# Patient Record
Sex: MALE | Race: WHITE | NOT HISPANIC OR LATINO
[De-identification: names, ages, dates, MRNs, and addresses within clinical notes are randomized per-mention and may not be internally consistent; named-entity substitution may affect disease eponyms.]

---

## 2024-02-28 PROBLEM — Z00.00 ENCOUNTER FOR PREVENTIVE HEALTH EXAMINATION: Status: ACTIVE | Noted: 2024-02-28

## 2024-03-05 ENCOUNTER — APPOINTMENT (OUTPATIENT)
Dept: SURGERY | Facility: CLINIC | Age: 59
End: 2024-03-05
Payer: COMMERCIAL

## 2024-03-05 VITALS
SYSTOLIC BLOOD PRESSURE: 119 MMHG | BODY MASS INDEX: 31.65 KG/M2 | HEART RATE: 60 BPM | WEIGHT: 190 LBS | DIASTOLIC BLOOD PRESSURE: 69 MMHG | HEIGHT: 65 IN

## 2024-03-05 DIAGNOSIS — Z85.6 PERSONAL HISTORY OF LEUKEMIA: ICD-10-CM

## 2024-03-05 DIAGNOSIS — Z80.1 FAMILY HISTORY OF MALIGNANT NEOPLASM OF TRACHEA, BRONCHUS AND LUNG: ICD-10-CM

## 2024-03-05 DIAGNOSIS — Z86.79 PERSONAL HISTORY OF OTHER DISEASES OF THE CIRCULATORY SYSTEM: ICD-10-CM

## 2024-03-05 DIAGNOSIS — Z78.9 OTHER SPECIFIED HEALTH STATUS: ICD-10-CM

## 2024-03-05 DIAGNOSIS — Z80.0 FAMILY HISTORY OF MALIGNANT NEOPLASM OF DIGESTIVE ORGANS: ICD-10-CM

## 2024-03-05 PROCEDURE — 99204 OFFICE O/P NEW MOD 45 MIN: CPT

## 2024-03-05 RX ORDER — VALSARTAN 40 MG/1
TABLET, COATED ORAL
Refills: 0 | Status: ACTIVE | COMMUNITY

## 2024-03-05 RX ORDER — AMLODIPINE BESYLATE 5 MG/1
TABLET ORAL
Refills: 0 | Status: ACTIVE | COMMUNITY

## 2024-03-05 NOTE — HISTORY OF PRESENT ILLNESS
[de-identified] : Pt c/o Thyroid nodule found on recent examination. denies dysphagia, hoarseness, SOB or RT exposure.  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 sonogram:  Right 3.0 cm nodule FNA: FLUS, [id number] positive normal TFTs, calcium 9.1 I have reviewed all old and new data and available images.  Additional information was obtained from others present at the time of visit to ensure the completeness of the history

## 2024-03-05 NOTE — CONSULT LETTER
[Dear  ___] : Dear  [unfilled], [Consult Letter:] : I had the pleasure of evaluating your patient, [unfilled]. [Please see my note below.] : Please see my note below. [Sincerely,] : Sincerely, [Consult Closing:] : Thank you very much for allowing me to participate in the care of this patient.  If you have any questions, please do not hesitate to contact me. [FreeTextEntry2] : Dr. Sandy Randall, Dr. Willem Farley [FreeTextEntry3] : Antonio Gamble MD, FACS System Director, Endocrine Surgery [facility] Associate  Professor of Surgery [facility] [employer] at [facility] [DrAngela  ___] : Dr. HERNANDEZ

## 2024-03-05 NOTE — ASSESSMENT
[FreeTextEntry1] : lengthy discussion regarding options for management including active surveillance  vs thyroid lobectomy with possible paratracheal node dissection, with or without frozen section vs total thyroidectomy with possible paratracheal node dissection.  risks, benefits and alternatives discussed at length.  I have discussed with the patient the anatomy of the area, the pathophysiology of the disease process and the rationale for surgery.  The attendant risks, possible complications and expected postoperative course have been discussed in detail.  I have given the patient the opportunity to ask questions, and all questions have been answered to the patient's satisfaction, and they wish to proceed with the planned procedure.  to be scheduled inpatient at [facility]. requested sonogram to assess cervical nodes. to call next week for results.

## 2024-03-05 NOTE — PHYSICAL EXAM
[de-identified] : 3 cm right thyroid nodule, well circumscribed and mobile [Laryngoscopy Performed] : laryngoscopy was performed, see procedure section for findings [Midline] : located in midline position [Normal] : orientation to person, place, and time: normal [de-identified] : indirect  laryngoscopy shows normal vocal cord mobility bilaterally with no lesions noted

## 2024-03-25 ENCOUNTER — OUTPATIENT (OUTPATIENT)
Dept: OUTPATIENT SERVICES | Facility: HOSPITAL | Age: 59
LOS: 1 days | End: 2024-03-25
Payer: COMMERCIAL

## 2024-03-25 ENCOUNTER — APPOINTMENT (OUTPATIENT)
Dept: ULTRASOUND IMAGING | Facility: IMAGING CENTER | Age: 59
End: 2024-03-25
Payer: COMMERCIAL

## 2024-03-25 DIAGNOSIS — E04.1 NONTOXIC SINGLE THYROID NODULE: ICD-10-CM

## 2024-03-25 PROCEDURE — 76536 US EXAM OF HEAD AND NECK: CPT | Mod: 26

## 2024-03-25 PROCEDURE — 76536 US EXAM OF HEAD AND NECK: CPT

## 2024-06-03 ENCOUNTER — OUTPATIENT (OUTPATIENT)
Dept: OUTPATIENT SERVICES | Facility: HOSPITAL | Age: 59
LOS: 1 days | End: 2024-06-03

## 2024-06-03 VITALS
HEIGHT: 65 IN | DIASTOLIC BLOOD PRESSURE: 87 MMHG | HEART RATE: 71 BPM | TEMPERATURE: 98 F | WEIGHT: 194.01 LBS | RESPIRATION RATE: 16 BRPM | OXYGEN SATURATION: 97 % | SYSTOLIC BLOOD PRESSURE: 138 MMHG

## 2024-06-03 DIAGNOSIS — E04.1 NONTOXIC SINGLE THYROID NODULE: ICD-10-CM

## 2024-06-03 DIAGNOSIS — Z98.2 PRESENCE OF CEREBROSPINAL FLUID DRAINAGE DEVICE: Chronic | ICD-10-CM

## 2024-06-03 DIAGNOSIS — I10 ESSENTIAL (PRIMARY) HYPERTENSION: ICD-10-CM

## 2024-06-03 DIAGNOSIS — Z91.89 OTHER SPECIFIED PERSONAL RISK FACTORS, NOT ELSEWHERE CLASSIFIED: ICD-10-CM

## 2024-06-03 LAB
ALBUMIN SERPL ELPH-MCNC: 4.4 G/DL — SIGNIFICANT CHANGE UP (ref 3.3–5)
ALP SERPL-CCNC: 72 U/L — SIGNIFICANT CHANGE UP (ref 40–120)
ALT FLD-CCNC: 19 U/L — SIGNIFICANT CHANGE UP (ref 4–41)
ANION GAP SERPL CALC-SCNC: 11 MMOL/L — SIGNIFICANT CHANGE UP (ref 7–14)
AST SERPL-CCNC: 18 U/L — SIGNIFICANT CHANGE UP (ref 4–40)
BILIRUB SERPL-MCNC: 0.4 MG/DL — SIGNIFICANT CHANGE UP (ref 0.2–1.2)
BUN SERPL-MCNC: 14 MG/DL — SIGNIFICANT CHANGE UP (ref 7–23)
CALCIUM SERPL-MCNC: 9.3 MG/DL — SIGNIFICANT CHANGE UP (ref 8.4–10.5)
CHLORIDE SERPL-SCNC: 105 MMOL/L — SIGNIFICANT CHANGE UP (ref 98–107)
CO2 SERPL-SCNC: 25 MMOL/L — SIGNIFICANT CHANGE UP (ref 22–31)
CREAT SERPL-MCNC: 0.93 MG/DL — SIGNIFICANT CHANGE UP (ref 0.5–1.3)
EGFR: 96 ML/MIN/1.73M2 — SIGNIFICANT CHANGE UP
GLUCOSE SERPL-MCNC: 60 MG/DL — LOW (ref 70–99)
HCT VFR BLD CALC: 43.3 % — SIGNIFICANT CHANGE UP (ref 39–50)
HGB BLD-MCNC: 15.1 G/DL — SIGNIFICANT CHANGE UP (ref 13–17)
MCHC RBC-ENTMCNC: 31.3 PG — SIGNIFICANT CHANGE UP (ref 27–34)
MCHC RBC-ENTMCNC: 34.9 GM/DL — SIGNIFICANT CHANGE UP (ref 32–36)
MCV RBC AUTO: 89.6 FL — SIGNIFICANT CHANGE UP (ref 80–100)
NRBC # BLD: 0 /100 WBCS — SIGNIFICANT CHANGE UP (ref 0–0)
NRBC # FLD: 0 K/UL — SIGNIFICANT CHANGE UP (ref 0–0)
PLATELET # BLD AUTO: 181 K/UL — SIGNIFICANT CHANGE UP (ref 150–400)
POTASSIUM SERPL-MCNC: 4.4 MMOL/L — SIGNIFICANT CHANGE UP (ref 3.5–5.3)
POTASSIUM SERPL-SCNC: 4.4 MMOL/L — SIGNIFICANT CHANGE UP (ref 3.5–5.3)
PROT SERPL-MCNC: 7 G/DL — SIGNIFICANT CHANGE UP (ref 6–8.3)
RBC # BLD: 4.83 M/UL — SIGNIFICANT CHANGE UP (ref 4.2–5.8)
RBC # FLD: 13.7 % — SIGNIFICANT CHANGE UP (ref 10.3–14.5)
SODIUM SERPL-SCNC: 141 MMOL/L — SIGNIFICANT CHANGE UP (ref 135–145)
WBC # BLD: 4.8 K/UL — SIGNIFICANT CHANGE UP (ref 3.8–10.5)
WBC # FLD AUTO: 4.8 K/UL — SIGNIFICANT CHANGE UP (ref 3.8–10.5)

## 2024-06-03 RX ORDER — AMLODIPINE AND VALSARTAN 5; 320 MG/1; MG/1
1 TABLET, FILM COATED ORAL
Refills: 0 | DISCHARGE

## 2024-06-03 NOTE — H&P PST ADULT - PROBLEM SELECTOR PLAN 1
Scheduled right thyroid lobectomy, paratracheal node dissection, possible total thyroidectomy.  Written & verbal preop instructions, gi prophylaxis & surgical soap given  Pt verbalized good understanding.  Teach back done on surgical soap instructions.

## 2024-06-03 NOTE — H&P PST ADULT - HEMATOLOGY/LYMPHATICS COMMENTS
h/o leukemia, h/o shunt placed at age 10 for treatment of leukemia, which has been non functional and has not been interrogated for past 20 years.

## 2024-06-03 NOTE — H&P PST ADULT - NSICDXFAMILYHX_GEN_ALL_CORE_FT
FAMILY HISTORY:  Father  Still living? Unknown  Family history of hypertension, Age at diagnosis: Age Unknown    Mother  Still living? Unknown  Family history of colon cancer, Age at diagnosis: Age Unknown  Family history of hypertension, Age at diagnosis: Age Unknown  Family history of lung cancer, Age at diagnosis: Age Unknown

## 2024-06-03 NOTE — H&P PST ADULT - NSICDXPASTMEDICALHX_GEN_ALL_CORE_FT
PAST MEDICAL HISTORY:  H/O leukemia     History of hepatitis C     Hypertension     Nontoxic single thyroid nodule     Obesity

## 2024-06-03 NOTE — H&P PST ADULT - HISTORY OF PRESENT ILLNESS
59y/o male Presents for preop eval for scheduled right thyroid lobectomy, paratracheal node dissection, possible total thyroidectomy.  Pt states thyroid nodule found on recent examination with PCP. denies dysphagia, hoarseness, SOB or RT exposure. 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  sonogram: Right 3.0 cm nodule  FNA: FLUS, [id number] positive 57y/o male Presents for preop eval for scheduled right thyroid lobectomy, paratracheal node dissection, possible total thyroidectomy.  Pt states thyroid nodule found on recent examination with PCP. Sonogram and biopsy done - negative.  Preop dx nontoxic single thyroid nodule.

## 2024-06-09 ENCOUNTER — TRANSCRIPTION ENCOUNTER (OUTPATIENT)
Age: 59
End: 2024-06-09

## 2024-06-10 ENCOUNTER — OUTPATIENT (OUTPATIENT)
Dept: OUTPATIENT SERVICES | Facility: HOSPITAL | Age: 59
LOS: 1 days | Discharge: HOME | End: 2024-06-10
Payer: COMMERCIAL

## 2024-06-10 ENCOUNTER — TRANSCRIPTION ENCOUNTER (OUTPATIENT)
Age: 59
End: 2024-06-10

## 2024-06-10 ENCOUNTER — RESULT REVIEW (OUTPATIENT)
Age: 59
End: 2024-06-10

## 2024-06-10 ENCOUNTER — APPOINTMENT (OUTPATIENT)
Dept: SURGERY | Facility: HOSPITAL | Age: 59
End: 2024-06-10
Payer: COMMERCIAL

## 2024-06-10 VITALS
SYSTOLIC BLOOD PRESSURE: 144 MMHG | HEIGHT: 65 IN | DIASTOLIC BLOOD PRESSURE: 93 MMHG | WEIGHT: 194.01 LBS | TEMPERATURE: 98 F | RESPIRATION RATE: 14 BRPM | HEART RATE: 63 BPM | OXYGEN SATURATION: 97 %

## 2024-06-10 DIAGNOSIS — E04.1 NONTOXIC SINGLE THYROID NODULE: ICD-10-CM

## 2024-06-10 DIAGNOSIS — Z98.2 PRESENCE OF CEREBROSPINAL FLUID DRAINAGE DEVICE: Chronic | ICD-10-CM

## 2024-06-10 PROCEDURE — 13132 CMPLX RPR F/C/C/M/N/AX/G/H/F: CPT | Mod: 59

## 2024-06-10 PROCEDURE — 60252 REMOVAL OF THYROID: CPT

## 2024-06-10 PROCEDURE — 88307 TISSUE EXAM BY PATHOLOGIST: CPT | Mod: 26

## 2024-06-10 DEVICE — LIGATING CLIPS WECK HORIZON SMALL-WIDE (RED) 24: Type: IMPLANTABLE DEVICE | Status: FUNCTIONAL

## 2024-06-10 RX ORDER — OXYCODONE HYDROCHLORIDE 5 MG/1
5 TABLET ORAL EVERY 6 HOURS
Refills: 0 | Status: DISCONTINUED | OUTPATIENT
Start: 2024-06-10 | End: 2024-06-11

## 2024-06-10 RX ORDER — AMLODIPINE BESYLATE 2.5 MG/1
5 TABLET ORAL DAILY
Refills: 0 | Status: DISCONTINUED | OUTPATIENT
Start: 2024-06-10 | End: 2024-06-10

## 2024-06-10 RX ORDER — VALSARTAN 80 MG/1
160 TABLET ORAL DAILY
Refills: 0 | Status: DISCONTINUED | OUTPATIENT
Start: 2024-06-10 | End: 2024-06-11

## 2024-06-10 RX ORDER — AMLODIPINE BESYLATE 2.5 MG/1
5 TABLET ORAL DAILY
Refills: 0 | Status: DISCONTINUED | OUTPATIENT
Start: 2024-06-10 | End: 2024-06-11

## 2024-06-10 RX ORDER — SODIUM CHLORIDE 9 MG/ML
1000 INJECTION, SOLUTION INTRAVENOUS
Refills: 0 | Status: DISCONTINUED | OUTPATIENT
Start: 2024-06-10 | End: 2024-06-10

## 2024-06-10 RX ORDER — OXYCODONE HYDROCHLORIDE 5 MG/1
10 TABLET ORAL EVERY 6 HOURS
Refills: 0 | Status: DISCONTINUED | OUTPATIENT
Start: 2024-06-10 | End: 2024-06-11

## 2024-06-10 RX ORDER — FENTANYL CITRATE 50 UG/ML
50 INJECTION INTRAVENOUS
Refills: 0 | Status: DISCONTINUED | OUTPATIENT
Start: 2024-06-10 | End: 2024-06-10

## 2024-06-10 RX ORDER — VALSARTAN 80 MG/1
160 TABLET ORAL DAILY
Refills: 0 | Status: DISCONTINUED | OUTPATIENT
Start: 2024-06-10 | End: 2024-06-10

## 2024-06-10 RX ORDER — OXYCODONE HYDROCHLORIDE 5 MG/1
5 TABLET ORAL ONCE
Refills: 0 | Status: DISCONTINUED | OUTPATIENT
Start: 2024-06-10 | End: 2024-06-10

## 2024-06-10 RX ORDER — ACETAMINOPHEN 500 MG
1000 TABLET ORAL EVERY 6 HOURS
Refills: 0 | Status: DISCONTINUED | OUTPATIENT
Start: 2024-06-10 | End: 2024-06-11

## 2024-06-10 RX ORDER — ONDANSETRON 8 MG/1
4 TABLET, FILM COATED ORAL ONCE
Refills: 0 | Status: DISCONTINUED | OUTPATIENT
Start: 2024-06-10 | End: 2024-06-10

## 2024-06-10 RX ORDER — OXYCODONE HYDROCHLORIDE 5 MG/1
5 TABLET ORAL EVERY 6 HOURS
Refills: 0 | Status: DISCONTINUED | OUTPATIENT
Start: 2024-06-10 | End: 2024-06-10

## 2024-06-10 RX ADMIN — Medication 1000 MILLIGRAM(S): at 17:54

## 2024-06-10 RX ADMIN — Medication 1000 MILLIGRAM(S): at 18:24

## 2024-06-10 NOTE — CHART NOTE - NSCHARTNOTEFT_GEN_A_CORE
Post Operative Note  Patient: JENIFER SMALLS 58y (1965) Male   MRN: [id number]  Location: [address]  Visit: 06-10-24 Inpatient  Date: 06-10-24 @ 15:04    Procedure: S/P right thyroid lobectomy and parathyroid node dissection    Subjective: Patient seen and examined post operatively. Reports pain as controlled. Denies nausea, vomiting, fever, chills, chest pain, SOB, cough.      Objective:  Vitals: T(F): 97.4 (06-10-24 @ 13:40), Max: 98.5 (06-10-24 @ 12:00)  HR: 69 (06-10-24 @ 13:40)  BP: 138/85 (06-10-24 @ 13:40) (133/81 - 144/93)  RR: 17 (06-10-24 @ 13:40)  SpO2: 96% (06-10-24 @ 13:40)  Vent Settings:     In:   06-10-24 @ 07:01  -  06-10-24 @ 15:04  --------------------------------------------------------  IN: 160 mL      IV Fluids: lactated ringers. 1000 milliLiter(s) (30 mL/Hr) IV Continuous <Continuous>      Out:   06-10-24 @ 07:01  -  06-10-24 @ 15:04  --------------------------------------------------------  OUT: 10 mL      EBL:     Voided Urine:   06-10-24 @ 07:01  -  06-10-24 @ 15:04  --------------------------------------------------------  OUT: 10 mL      Umana Catheter: yes no   Drains:   DONNA:   06-10-24 @ 07:01  -  06-10-24 @ 15:04  --------------------------------------------------------  OUT: 10 mL        Physical Examination:  General: NAD, resting comfortably in bed  HEENT: Normocephalic atraumatic, neck incision c/d/i, DONNA x 1 with SS output  Respiratory: Nonlabored respirations, normal CW expansion.  Cardio: S1S2, regular rate and rhythm.  Abdomen: soft, nontender, nondistended  Vascular: extremities are warm and well perfused.       Assessment:  58yMale patient with PMH HTN and thyroid nodule presents s/p R thyroid lobectomy and paratracheal node dissection on 6/10    Plan:  - Diet: regular  - Pain control w tylenol and PRN oxycodone  - DONNA drain monitoring, dc tomorrow am  - Home meds restarted  - Discharge home tomorrow    Date/Time: 06-10-24 @ 15:04

## 2024-06-10 NOTE — BRIEF OPERATIVE NOTE - NSICDXBRIEFPROCEDURE_GEN_ALL_CORE_FT
PROCEDURES:  Partial thyroid lobectomy with isthmusectomy 10-Pineda-2024 11:40:42  Joao Medina Sub

## 2024-06-10 NOTE — BRIEF OPERATIVE NOTE - OPERATION/FINDINGS
Right thyroid lobectomy and isthmusectomy with paratracheal node dissection. Incision was made and R thyroid lobe and isthmus excised. Paratracheal node excised. Hemostasis achieved. DONNA drain placed. Incision closed with chromic and monocryl sutures.

## 2024-06-10 NOTE — DISCHARGE NOTE PROVIDER - NSDCMRMEDTOKEN_GEN_ALL_CORE_FT
amlodipine-valsartan 5 mg-160 mg oral tablet: 1 tab(s) orally once a day   acetaminophen 500 mg oral tablet: 2 tab(s) orally every 6 hours as needed for  mild pain  amlodipine-valsartan 5 mg-160 mg oral tablet: 1 tab(s) orally once a day

## 2024-06-10 NOTE — PATIENT PROFILE ADULT - FALL HARM RISK - HARM RISK INTERVENTIONS

## 2024-06-10 NOTE — DISCHARGE NOTE PROVIDER - CARE PROVIDER_API CALL
Antonio Gamble  Surgery  [address] [place] [address] [address]  [place] [address]  Phone: [phone]  Fax: [phone]  Follow Up Time:

## 2024-06-11 ENCOUNTER — TRANSCRIPTION ENCOUNTER (OUTPATIENT)
Age: 59
End: 2024-06-11

## 2024-06-11 VITALS
HEART RATE: 66 BPM | DIASTOLIC BLOOD PRESSURE: 87 MMHG | TEMPERATURE: 98 F | OXYGEN SATURATION: 97 % | RESPIRATION RATE: 16 BRPM | SYSTOLIC BLOOD PRESSURE: 131 MMHG

## 2024-06-11 RX ORDER — ACETAMINOPHEN 500 MG
2 TABLET ORAL
Qty: 0 | Refills: 0 | DISCHARGE
Start: 2024-06-11

## 2024-06-11 RX ADMIN — VALSARTAN 160 MILLIGRAM(S): 80 TABLET ORAL at 07:38

## 2024-06-11 RX ADMIN — AMLODIPINE BESYLATE 5 MILLIGRAM(S): 2.5 TABLET ORAL at 06:02

## 2024-06-11 RX ADMIN — Medication 1000 MILLIGRAM(S): at 06:02

## 2024-06-11 RX ADMIN — Medication 1000 MILLIGRAM(S): at 06:32

## 2024-06-11 RX ADMIN — Medication 1000 MILLIGRAM(S): at 00:25

## 2024-06-11 RX ADMIN — Medication 1000 MILLIGRAM(S): at 00:08

## 2024-06-11 NOTE — PROGRESS NOTE ADULT - SUBJECTIVE AND OBJECTIVE BOX
1 day s/p thyroid lobectomy. afebrile. no collections. now taking adequate po.  DONNA removed. discharge home.  f/u in office

## 2024-06-11 NOTE — DISCHARGE NOTE NURSING/CASE MANAGEMENT/SOCIAL WORK - PATIENT PORTAL LINK FT
You can access the FollowMyHealth Patient Portal offered by [facility] by registering at the following website: [email or web address]. By joining [employer]’s FollowMyHealth portal, you will also be able to view your health information using other applications (apps) compatible with our system.

## 2024-06-11 NOTE — DISCHARGE NOTE NURSING/CASE MANAGEMENT/SOCIAL WORK - NSDCPEFALRISK_GEN_ALL_CORE
For information on Fall & Injury Prevention, visit: [email or web address] OR  [email or web address] OR  https://www.cdc.gov/steadi/patient.html

## 2024-06-17 ENCOUNTER — NON-APPOINTMENT (OUTPATIENT)
Age: 59
End: 2024-06-17

## 2024-06-18 LAB — SURGICAL PATHOLOGY STUDY: SIGNIFICANT CHANGE UP

## 2024-06-20 ENCOUNTER — APPOINTMENT (OUTPATIENT)
Dept: SURGERY | Facility: CLINIC | Age: 59
End: 2024-06-20
Payer: COMMERCIAL

## 2024-06-20 DIAGNOSIS — E04.1 NONTOXIC SINGLE THYROID NODULE: ICD-10-CM

## 2024-06-20 PROBLEM — Z85.6 PERSONAL HISTORY OF LEUKEMIA: Chronic | Status: ACTIVE | Noted: 2024-06-03

## 2024-06-20 PROBLEM — Z86.19 PERSONAL HISTORY OF OTHER INFECTIOUS AND PARASITIC DISEASES: Chronic | Status: ACTIVE | Noted: 2024-06-03

## 2024-06-20 PROBLEM — I10 ESSENTIAL (PRIMARY) HYPERTENSION: Chronic | Status: ACTIVE | Noted: 2024-06-03

## 2024-06-20 PROBLEM — E66.9 OBESITY, UNSPECIFIED: Chronic | Status: ACTIVE | Noted: 2024-06-03

## 2024-06-20 PROCEDURE — 99024 POSTOP FOLLOW-UP VISIT: CPT

## 2024-06-20 NOTE — ASSESSMENT
[FreeTextEntry1] : s/p Right thyroid lobectomy daily care Dr Gamble discussed path report with Dr Bonner agreed with Completion thyroidectomy Path Discussed in detail with patient and agrees with plan of care f/u Dr bonner f/u 6 weeks

## 2024-06-21 ENCOUNTER — TRANSCRIPTION ENCOUNTER (OUTPATIENT)
Age: 59
End: 2024-06-21

## 2024-08-01 ENCOUNTER — APPOINTMENT (OUTPATIENT)
Dept: SURGERY | Facility: CLINIC | Age: 59
End: 2024-08-01
Payer: COMMERCIAL

## 2024-08-01 ENCOUNTER — NON-APPOINTMENT (OUTPATIENT)
Age: 59
End: 2024-08-01

## 2024-08-01 ENCOUNTER — LABORATORY RESULT (OUTPATIENT)
Age: 59
End: 2024-08-01

## 2024-08-01 DIAGNOSIS — C73 MALIGNANT NEOPLASM OF THYROID GLAND: ICD-10-CM

## 2024-08-01 LAB
T3 SERPL-MCNC: 108 NG/DL
T4 FREE SERPL-MCNC: 1.2 NG/DL
TSH SERPL-ACNC: 2.11 UIU/ML

## 2024-08-01 PROCEDURE — 36415 COLL VENOUS BLD VENIPUNCTURE: CPT

## 2024-08-01 PROCEDURE — 99024 POSTOP FOLLOW-UP VISIT: CPT

## 2024-08-01 NOTE — HISTORY OF PRESENT ILLNESS
[de-identified] : Pt 2 months s/p thyroid lobectomy doing well on no synthroid scheduled for completion in September

## 2024-08-01 NOTE — ASSESSMENT
[FreeTextEntry1] : s/p thyroid lobectomy daily care labs in office f/u after completion thyroidectomy

## 2024-08-01 NOTE — PHYSICAL EXAM
[de-identified] : well healed scar [Midline] : located in midline position [Normal] : orientation to person, place, and time: normal

## 2024-08-02 ENCOUNTER — NON-APPOINTMENT (OUTPATIENT)
Age: 59
End: 2024-08-02

## 2024-08-02 LAB
THYROGLOB AB SERPL-ACNC: <20 IU/ML
THYROGLOB SERPL-MCNC: 9.46 NG/ML

## 2024-09-06 ENCOUNTER — OUTPATIENT (OUTPATIENT)
Dept: OUTPATIENT SERVICES | Facility: HOSPITAL | Age: 59
LOS: 1 days | End: 2024-09-06

## 2024-09-06 VITALS
RESPIRATION RATE: 16 BRPM | OXYGEN SATURATION: 98 % | HEIGHT: 65 IN | SYSTOLIC BLOOD PRESSURE: 128 MMHG | DIASTOLIC BLOOD PRESSURE: 70 MMHG | HEART RATE: 68 BPM | WEIGHT: 188.05 LBS | TEMPERATURE: 98 F

## 2024-09-06 DIAGNOSIS — Z91.89 OTHER SPECIFIED PERSONAL RISK FACTORS, NOT ELSEWHERE CLASSIFIED: ICD-10-CM

## 2024-09-06 DIAGNOSIS — C73 MALIGNANT NEOPLASM OF THYROID GLAND: ICD-10-CM

## 2024-09-06 DIAGNOSIS — Z98.2 PRESENCE OF CEREBROSPINAL FLUID DRAINAGE DEVICE: Chronic | ICD-10-CM

## 2024-09-06 DIAGNOSIS — I10 ESSENTIAL (PRIMARY) HYPERTENSION: ICD-10-CM

## 2024-09-06 NOTE — H&P PST ADULT - HISTORY OF PRESENT ILLNESS
59y/o male Presents for preop eval for scheduled right thyroid lobectomy, paratracheal node dissection, possible total thyroidectomy.  Pt states thyroid nodule found on recent examination with PCP. Sonogram and biopsy done - negative.  Preop dx nontoxic single thyroid nodule. 59 year old male with hx of HTN , leukemia at age 10 with Ommaya shunt in place ( right scalp) since then for treatment of leukemia, which has been non functional and has not been interrogated for past 20 years , states  that he had a recent neck examination with PCP and noted to have right thyroid nodule . SONO and FNA biopsy done which confirmed right thyroid nodule. Pt underwent  right thyroid lobectomy on 06/10/2024 . Pt reports , pathology resulted with malignancy of  thyroid gland. Pt presents  to PST today with pre op dx of malignant neoplasm of thyroid gland and is scheduled for completion of thyroidectomy .

## 2024-09-06 NOTE — H&P PST ADULT - HEMATOLOGY/LYMPHATICS COMMENTS
h/o leukemia, h/o shunt placed at age 10 for treatment of leukemia, which has been non functional and has not been interrogated for past 20 years. h/o leukemia, h/o shunt placed at age 10 for treatment of leukemia, which has been non functional and has not been interrogated for past 20 years.. Pt states " it never bothers him but planning to take it out after thyroid surgery ".

## 2024-09-06 NOTE — H&P PST ADULT - PROBLEM SELECTOR PLAN 1
Patient tentatively scheduled for completion of thyroidectomy on 09/16/2024  Pre-op instructions provided. Pt given verbal and written instructions with teach back on chlorhexidine wash  and pepcid. Pt verbalized understanding with return demonstration.

## 2024-09-06 NOTE — H&P PST ADULT - CARDIOVASCULAR
regular rate and rhythm/S1 S2 present/no murmur/normal PMI/no pedal edema details… regular rate and rhythm/S1 S2 present/no JVD/normal PMI/no pedal edema/vascular

## 2024-09-06 NOTE — H&P PST ADULT - ASSESSMENT
Preop dx nontoxic single thyroid nodule     Pt instructed to take amlodipine-valsartan  on morning of surgery. Malignant neoplasm of thyroid gland

## 2024-09-06 NOTE — H&P PST ADULT - MUSCULOSKELETAL
negative ROM intact/normal gait/strength 5/5 bilateral upper extremities/strength 5/5 bilateral lower extremities details… ROM intact/no calf tenderness/normal gait/strength 5/5 bilateral upper extremities/strength 5/5 bilateral lower extremities/extremities exam

## 2024-09-06 NOTE — H&P PST ADULT - GENITOURINARY MALE
0926: To PACU post EGD. Pt is extubated, breathing is spontaneous and unlabored.   0940: Pt awake and alert. No pain or nausea.  0953: Report given to SAUL Mijares RN.   not examined

## 2024-09-06 NOTE — H&P PST ADULT - NEGATIVE CARDIOVASCULAR SYMPTOMS
no chest pain/no palpitations/no dyspnea on exertion/no orthopnea/no peripheral edema/no claudication no chest pain/no palpitations/no dyspnea on exertion/no peripheral edema

## 2024-09-06 NOTE — H&P PST ADULT - ENDOCRINE COMMENTS
right thyroid nodule - refer to hpi right thyroid nodule - s/p right thyroidectomy 06/10/24 - site benign. Pt reports pathology was positive for malignancy so now  awaiting for  completion of thyroidectomy

## 2024-09-06 NOTE — H&P PST ADULT - NSICDXPASTMEDICALHX_GEN_ALL_CORE_FT
PAST MEDICAL HISTORY:  H/O leukemia     History of hepatitis C     Hypertension     Nontoxic single thyroid nodule     Obesity      PAST MEDICAL HISTORY:  H/O leukemia     History of hepatitis C     Hypertension     Malignant neoplasm of thyroid gland     Nontoxic single thyroid nodule     Obesity

## 2024-09-06 NOTE — H&P PST ADULT - PROBLEM SELECTOR PLAN 4
DELIRIUM SCREENING   1-Patient eligible for jenniffer risk screen age>75? NO  2-Health care proxy paperwork given to patient? Yes, Health care proxy form provided and explained  3-Impaired mobility (ie: uses cane, walker, wheelchair, or assist device)?   4-Known dementia diagnosis?   5-Impaired functional status (METS<4)?   6-Malnutrition BMI<20?

## 2024-09-06 NOTE — H&P PST ADULT - PRIMARY CARE PROVIDER
Refill given . Patient needs appointment for further refills   Dr marbella Farley [phone] Dr Willem Farley [phone]

## 2024-09-13 NOTE — ASU PATIENT PROFILE, ADULT - NSICDXPASTMEDICALHX_GEN_ALL_CORE_FT
PAST MEDICAL HISTORY:  H/O leukemia     History of hepatitis C     Hypertension     Malignant neoplasm of thyroid gland     Nontoxic single thyroid nodule     Obesity

## 2024-09-13 NOTE — ASU PATIENT PROFILE, ADULT - FALL HARM RISK - UNIVERSAL INTERVENTIONS
Bed in lowest position, wheels locked, appropriate side rails in place/Call bell, personal items and telephone in reach/Instruct patient to call for assistance before getting out of bed or chair/Non-slip footwear when patient is out of bed/[place] to call system/Physically safe environment - no spills, clutter or unnecessary equipment/Purposeful Proactive Rounding/Room/bathroom lighting operational, light cord in reach

## 2024-09-13 NOTE — ASU PATIENT PROFILE, ADULT - NSSUBSTANCEUSE_GEN_ALL_CORE_SD
Procedure Note: Treatment was administered using the setting parameters listed above. Detail Level: Simple Add Another Setting?: no Spot Size: 30mm x 9mm Post-Care Instructions: I reviewed with the patient in detail post-care instructions. Patient should avoid sun exposure before and after treatment. Hair Diameter: medium Anesthesia Type: 1% lidocaine with epinephrine Handpiece: Diode Fluence (J/Cm2): 6 Treatment Number: 0 denies/caffeine Treatment Mode: Comfort Pulse Width (Include Units): 1.5 Rate (Hz): 2 Hair Color: light brown Total Pulses (Optional): 10 Location Override: nose Hair Density: low Consent: Written consent obtained.  Risks reviewed including but not limited to crusting, scabbing, blistering, scarring, darker or lighter pigmentary change, paradoxical hair regrowth, incomplete removal of hair and infection. denies street drugs/caffeine

## 2024-09-15 ENCOUNTER — TRANSCRIPTION ENCOUNTER (OUTPATIENT)
Age: 59
End: 2024-09-15

## 2024-09-16 ENCOUNTER — OUTPATIENT (OUTPATIENT)
Dept: OUTPATIENT SERVICES | Facility: HOSPITAL | Age: 59
LOS: 1 days | Discharge: HOME | End: 2024-09-16
Payer: COMMERCIAL

## 2024-09-16 ENCOUNTER — APPOINTMENT (OUTPATIENT)
Dept: SURGERY | Facility: HOSPITAL | Age: 59
End: 2024-09-16
Payer: COMMERCIAL

## 2024-09-16 ENCOUNTER — RESULT REVIEW (OUTPATIENT)
Age: 59
End: 2024-09-16

## 2024-09-16 ENCOUNTER — TRANSCRIPTION ENCOUNTER (OUTPATIENT)
Age: 59
End: 2024-09-16

## 2024-09-16 VITALS
SYSTOLIC BLOOD PRESSURE: 136 MMHG | TEMPERATURE: 98 F | RESPIRATION RATE: 15 BRPM | DIASTOLIC BLOOD PRESSURE: 86 MMHG | OXYGEN SATURATION: 100 % | HEIGHT: 65 IN | WEIGHT: 188.05 LBS | HEART RATE: 62 BPM

## 2024-09-16 VITALS
RESPIRATION RATE: 16 BRPM | DIASTOLIC BLOOD PRESSURE: 86 MMHG | SYSTOLIC BLOOD PRESSURE: 138 MMHG | OXYGEN SATURATION: 96 % | HEART RATE: 71 BPM

## 2024-09-16 DIAGNOSIS — C73 MALIGNANT NEOPLASM OF THYROID GLAND: ICD-10-CM

## 2024-09-16 DIAGNOSIS — Z98.2 PRESENCE OF CEREBROSPINAL FLUID DRAINAGE DEVICE: Chronic | ICD-10-CM

## 2024-09-16 LAB
CALCIUM SERPL-MCNC: 8.9 MG/DL — SIGNIFICANT CHANGE UP (ref 8.4–10.5)
CALCIUM SERPL-MCNC: 9.2 MG/DL — SIGNIFICANT CHANGE UP (ref 8.4–10.5)

## 2024-09-16 PROCEDURE — 13132 CMPLX RPR F/C/C/M/N/AX/G/H/F: CPT | Mod: 59

## 2024-09-16 PROCEDURE — 60260 REPEAT THYROID SURGERY: CPT | Mod: LT

## 2024-09-16 PROCEDURE — 11423 EXC H-F-NK-SP B9+MARG 2.1-3: CPT

## 2024-09-16 PROCEDURE — 88305 TISSUE EXAM BY PATHOLOGIST: CPT | Mod: 26

## 2024-09-16 PROCEDURE — 88307 TISSUE EXAM BY PATHOLOGIST: CPT | Mod: 26

## 2024-09-16 DEVICE — LIGATING CLIPS WECK HORIZON MEDIUM (BLUE) 24
Type: IMPLANTABLE DEVICE | Status: NON-FUNCTIONAL
Removed: 2024-09-16

## 2024-09-16 DEVICE — LIGATING CLIPS WECK HORIZON SMALL-WIDE (RED) 24
Type: IMPLANTABLE DEVICE | Status: NON-FUNCTIONAL
Removed: 2024-09-16

## 2024-09-16 DEVICE — SURGICEL FIBRILLAR 2 X 4"
Type: IMPLANTABLE DEVICE | Status: NON-FUNCTIONAL
Removed: 2024-09-16

## 2024-09-16 RX ORDER — ACETAMINOPHEN 325 MG/1
1000 TABLET ORAL EVERY 6 HOURS
Refills: 0 | Status: DISCONTINUED | OUTPATIENT
Start: 2024-09-16 | End: 2024-09-17

## 2024-09-16 RX ORDER — CALCIUM CARBONATE 500(1250)
1 TABLET ORAL
Qty: 0 | Refills: 0 | DISCHARGE
Start: 2024-09-16

## 2024-09-16 RX ORDER — CALCIUM GLUCONATE 61(648) MG
1 TABLET ORAL ONCE
Refills: 0 | Status: COMPLETED | OUTPATIENT
Start: 2024-09-16 | End: 2024-09-16

## 2024-09-16 RX ORDER — OXYCODONE HYDROCHLORIDE 5 MG/1
5 TABLET ORAL ONCE
Refills: 0 | Status: DISCONTINUED | OUTPATIENT
Start: 2024-09-16 | End: 2024-09-17

## 2024-09-16 RX ORDER — ACETAMINOPHEN 325 MG/1
2 TABLET ORAL
Qty: 0 | Refills: 0 | DISCHARGE
Start: 2024-09-16

## 2024-09-16 RX ORDER — CALCIUM CARBONATE 500(1250)
1 TABLET ORAL EVERY 6 HOURS
Refills: 0 | Status: DISCONTINUED | OUTPATIENT
Start: 2024-09-16 | End: 2024-09-17

## 2024-09-16 RX ORDER — FENTANYL CITRATE 50 UG/ML
50 INJECTION INTRAMUSCULAR; INTRAVENOUS
Refills: 0 | Status: DISCONTINUED | OUTPATIENT
Start: 2024-09-16 | End: 2024-09-17

## 2024-09-16 RX ADMIN — Medication 1 TABLET(S): at 10:03

## 2024-09-16 RX ADMIN — Medication 30 MILLILITER(S): at 10:11

## 2024-09-16 RX ADMIN — Medication 100 GRAM(S): at 09:28

## 2024-09-16 NOTE — BRIEF OPERATIVE NOTE - OPERATION/FINDINGS
History of right thyroid lobectomy. Completion thyroidectomy with excision of neck scar tissue. Hemostasis achieved, DONNA drain left in surgical bed, closed in layers.

## 2024-09-16 NOTE — ASU PREOP CHECKLIST - SKIN PREP
Samples Given: -Sernivo spray 0.05% twice a day to affected areas for 2 weeks
Discontinue Regimen: -prednisone 10 mg tablet\\n-doxycycline monohydrate 100 mg capsule
Detail Level: Zone
Plan: Tissue culture done
Render In Strict Bullet Format?: No
Initiate Treatment: -Bactrim  mg-160 mg tablet Take 1 tablet twice a day for 7 days
done

## 2024-09-16 NOTE — BRIEF OPERATIVE NOTE - COMMENTS
Steri strips to incision, opsite to drain site. Drain secured with silk. Calcium gluconate and calcium checks in PACU

## 2024-09-16 NOTE — ASU DISCHARGE PLAN (ADULT/PEDIATRIC) - CARE PROVIDER_API CALL
Antonio Gamble  Surgery  [address] [place] [address] [address]  [place] [address]  Phone: [phone]  Fax: [phone]  Follow Up Time:

## 2024-09-16 NOTE — ASU DISCHARGE PLAN (ADULT/PEDIATRIC) - NURSING INSTRUCTIONS
You were given intravenous TYLENOL for pain management at 8:20 am. Please DO NOT take any products containing TYLENOL or ACETAMINOPHEN, such as VICODIN, PERCOCET, EXCEDRIN, and any over-the-counter cold medication for the next 6 hours (until __2:20pm ). DO NOT TAKE MORE THAN 4000 MG OF TYLENOL in a 24 hour period. You were given intravenous TYLENOL for pain management at 8:20 am. Please DO NOT take any products containing TYLENOL or ACETAMINOPHEN, such as VICODIN, PERCOCET, EXCEDRIN, and any over-the-counter cold medication for the next 6 hours (until __2:20pm ). DO NOT TAKE MORE THAN 4000 MG OF TYLENOL in a 24 hour period.    Do not scrub or rub incision while showering. Pat dry after shower. No cream, lotion, ointment on incisions unless directed by surgeon.

## 2024-09-17 ENCOUNTER — APPOINTMENT (OUTPATIENT)
Dept: SURGERY | Facility: CLINIC | Age: 59
End: 2024-09-17
Payer: COMMERCIAL

## 2024-09-17 PROBLEM — C73 MALIGNANT NEOPLASM OF THYROID GLAND: Chronic | Status: ACTIVE | Noted: 2024-09-06

## 2024-09-17 PROCEDURE — 99024 POSTOP FOLLOW-UP VISIT: CPT

## 2024-09-17 RX ORDER — LEVOTHYROXINE SODIUM 0.12 MG/1
125 TABLET ORAL DAILY
Qty: 90 | Refills: 1 | Status: ACTIVE | COMMUNITY
Start: 2024-09-17 | End: 1900-01-01

## 2024-09-17 NOTE — PHYSICAL EXAM
[de-identified] : well healed scar [Midline] : located in midline position [Normal] : orientation to person, place, and time: normal

## 2024-09-17 NOTE — ASSESSMENT
[FreeTextEntry1] : s/p Thyroidectomy drain removed synthroid 125 mcg daily Thyrogen scan requested Pt wishes to follow up here and not return to Endo daily care call for pathology results

## 2024-09-23 DIAGNOSIS — C73 MALIGNANT NEOPLASM OF THYROID GLAND: ICD-10-CM

## 2024-09-24 LAB — SURGICAL PATHOLOGY STUDY: SIGNIFICANT CHANGE UP

## 2024-10-22 ENCOUNTER — APPOINTMENT (OUTPATIENT)
Dept: SURGERY | Facility: CLINIC | Age: 59
End: 2024-10-22
Payer: COMMERCIAL

## 2024-10-22 DIAGNOSIS — C73 MALIGNANT NEOPLASM OF THYROID GLAND: ICD-10-CM

## 2024-10-22 PROCEDURE — 99024 POSTOP FOLLOW-UP VISIT: CPT

## 2024-10-22 NOTE — PHYSICAL EXAM
[de-identified] : well healed scar [Midline] : located in midline position [Normal] : orientation to person, place, and time: normal

## 2024-10-22 NOTE — ASSESSMENT
[FreeTextEntry1] : Thyroid malignancy labs reviewed and discussed Thyrogen scan as planned f/u 4 months sooner if needed

## 2024-12-04 ENCOUNTER — TRANSCRIPTION ENCOUNTER (OUTPATIENT)
Age: 59
End: 2024-12-04

## 2024-12-16 ENCOUNTER — OUTPATIENT (OUTPATIENT)
Dept: OUTPATIENT SERVICES | Facility: HOSPITAL | Age: 59
LOS: 1 days | End: 2024-12-16
Payer: COMMERCIAL

## 2024-12-16 ENCOUNTER — APPOINTMENT (OUTPATIENT)
Dept: NUCLEAR MEDICINE | Facility: HOSPITAL | Age: 59
End: 2024-12-16

## 2024-12-16 VITALS
HEIGHT: 65 IN | WEIGHT: 184.97 LBS | HEART RATE: 59 BPM | OXYGEN SATURATION: 97 % | RESPIRATION RATE: 17 BRPM | SYSTOLIC BLOOD PRESSURE: 125 MMHG | DIASTOLIC BLOOD PRESSURE: 84 MMHG

## 2024-12-16 DIAGNOSIS — C73 MALIGNANT NEOPLASM OF THYROID GLAND: ICD-10-CM

## 2024-12-16 DIAGNOSIS — Z98.2 PRESENCE OF CEREBROSPINAL FLUID DRAINAGE DEVICE: Chronic | ICD-10-CM

## 2024-12-17 ENCOUNTER — APPOINTMENT (OUTPATIENT)
Dept: NUCLEAR MEDICINE | Facility: HOSPITAL | Age: 59
End: 2024-12-17

## 2024-12-17 VITALS
HEART RATE: 64 BPM | OXYGEN SATURATION: 99 % | DIASTOLIC BLOOD PRESSURE: 68 MMHG | SYSTOLIC BLOOD PRESSURE: 114 MMHG | RESPIRATION RATE: 16 BRPM

## 2024-12-18 ENCOUNTER — RESULT REVIEW (OUTPATIENT)
Age: 59
End: 2024-12-18

## 2024-12-18 ENCOUNTER — APPOINTMENT (OUTPATIENT)
Dept: NUCLEAR MEDICINE | Facility: HOSPITAL | Age: 59
End: 2024-12-18

## 2024-12-20 ENCOUNTER — RESULT REVIEW (OUTPATIENT)
Age: 59
End: 2024-12-20

## 2024-12-20 ENCOUNTER — APPOINTMENT (OUTPATIENT)
Dept: NUCLEAR MEDICINE | Facility: HOSPITAL | Age: 59
End: 2024-12-20

## 2024-12-20 PROCEDURE — 78830 RP LOCLZJ TUM SPECT W/CT 1: CPT

## 2024-12-20 PROCEDURE — 78018 THYROID MET IMAGING BODY: CPT

## 2024-12-20 PROCEDURE — 96372 THER/PROPH/DIAG INJ SC/IM: CPT

## 2024-12-20 PROCEDURE — 78018 THYROID MET IMAGING BODY: CPT | Mod: 26

## 2024-12-20 PROCEDURE — 78020 THYROID MET UPTAKE: CPT | Mod: 26

## 2024-12-20 PROCEDURE — A9528: CPT

## 2024-12-20 PROCEDURE — 78830 RP LOCLZJ TUM SPECT W/CT 1: CPT | Mod: 26

## 2024-12-20 PROCEDURE — 78020 THYROID MET UPTAKE: CPT

## 2024-12-23 DIAGNOSIS — C73 MALIGNANT NEOPLASM OF THYROID GLAND: ICD-10-CM

## 2025-01-13 ENCOUNTER — OUTPATIENT (OUTPATIENT)
Dept: OUTPATIENT SERVICES | Facility: HOSPITAL | Age: 60
LOS: 1 days | End: 2025-01-13
Payer: COMMERCIAL

## 2025-01-13 ENCOUNTER — APPOINTMENT (OUTPATIENT)
Dept: NUCLEAR MEDICINE | Facility: HOSPITAL | Age: 60
End: 2025-01-13

## 2025-01-13 VITALS
HEART RATE: 65 BPM | OXYGEN SATURATION: 99 % | DIASTOLIC BLOOD PRESSURE: 76 MMHG | RESPIRATION RATE: 16 BRPM | SYSTOLIC BLOOD PRESSURE: 121 MMHG

## 2025-01-13 DIAGNOSIS — Z98.2 PRESENCE OF CEREBROSPINAL FLUID DRAINAGE DEVICE: Chronic | ICD-10-CM

## 2025-01-13 DIAGNOSIS — C73 MALIGNANT NEOPLASM OF THYROID GLAND: ICD-10-CM

## 2025-01-13 PROCEDURE — 99203 OFFICE O/P NEW LOW 30 MIN: CPT

## 2025-01-13 RX ORDER — LEVOTHYROXINE SODIUM 300 MCG
1 TABLET ORAL
Refills: 0 | DISCHARGE

## 2025-01-13 NOTE — ASU PATIENT PROFILE, ADULT - FALL HARM RISK - UNIVERSAL INTERVENTIONS
Bed in lowest position, wheels locked, appropriate side rails in place/Call bell, personal items and telephone in reach/Instruct patient to call for assistance before getting out of bed or chair/Non-slip footwear when patient is out of bed/[place] to call system/Physically safe environment - no spills, clutter or unnecessary equipment/Purposeful Proactive Rounding/Room/bathroom lighting operational, light cord in reach

## 2025-01-14 ENCOUNTER — APPOINTMENT (OUTPATIENT)
Dept: NUCLEAR MEDICINE | Facility: HOSPITAL | Age: 60
End: 2025-01-14

## 2025-01-14 VITALS
DIASTOLIC BLOOD PRESSURE: 66 MMHG | SYSTOLIC BLOOD PRESSURE: 132 MMHG | RESPIRATION RATE: 15 BRPM | OXYGEN SATURATION: 100 % | HEART RATE: 61 BPM

## 2025-01-14 RX ORDER — ONDANSETRON 4 MG/1
4 TABLET, ORALLY DISINTEGRATING ORAL 4 TIMES DAILY
Qty: 20 | Refills: 0 | Status: ACTIVE | COMMUNITY
Start: 2025-01-14 | End: 1900-01-01

## 2025-01-15 ENCOUNTER — RESULT REVIEW (OUTPATIENT)
Age: 60
End: 2025-01-15

## 2025-01-15 ENCOUNTER — APPOINTMENT (OUTPATIENT)
Dept: NUCLEAR MEDICINE | Facility: HOSPITAL | Age: 60
End: 2025-01-15

## 2025-01-15 PROCEDURE — 79005 NUCLEAR RX ORAL ADMIN: CPT | Mod: 26

## 2025-01-21 ENCOUNTER — RESULT REVIEW (OUTPATIENT)
Age: 60
End: 2025-01-21

## 2025-01-21 ENCOUNTER — APPOINTMENT (OUTPATIENT)
Dept: NUCLEAR MEDICINE | Facility: HOSPITAL | Age: 60
End: 2025-01-21

## 2025-01-21 PROCEDURE — 78018 THYROID MET IMAGING BODY: CPT

## 2025-01-21 PROCEDURE — 78018 THYROID MET IMAGING BODY: CPT | Mod: 26

## 2025-01-21 PROCEDURE — 78830 RP LOCLZJ TUM SPECT W/CT 1: CPT | Mod: 26

## 2025-01-21 PROCEDURE — 78830 RP LOCLZJ TUM SPECT W/CT 1: CPT

## 2025-01-21 PROCEDURE — 79005 NUCLEAR RX ORAL ADMIN: CPT

## 2025-01-21 PROCEDURE — A9517: CPT

## 2025-01-21 PROCEDURE — 96372 THER/PROPH/DIAG INJ SC/IM: CPT

## 2025-01-29 ENCOUNTER — NON-APPOINTMENT (OUTPATIENT)
Age: 60
End: 2025-01-29

## 2025-01-29 ENCOUNTER — TRANSCRIPTION ENCOUNTER (OUTPATIENT)
Age: 60
End: 2025-01-29

## 2025-02-20 ENCOUNTER — APPOINTMENT (OUTPATIENT)
Dept: SURGERY | Facility: CLINIC | Age: 60
End: 2025-02-20
Payer: COMMERCIAL

## 2025-02-20 ENCOUNTER — LABORATORY RESULT (OUTPATIENT)
Age: 60
End: 2025-02-20

## 2025-02-20 ENCOUNTER — NON-APPOINTMENT (OUTPATIENT)
Age: 60
End: 2025-02-20

## 2025-02-20 DIAGNOSIS — C73 MALIGNANT NEOPLASM OF THYROID GLAND: ICD-10-CM

## 2025-02-20 LAB
CALCIUM SERPL-MCNC: 9.2 MG/DL
CALCIUM SERPL-MCNC: 9.2 MG/DL
PARATHYROID HORMONE INTACT: 41 PG/ML
T3 SERPL-MCNC: 99 NG/DL
T4 FREE SERPL-MCNC: 1.7 NG/DL
THYROGLOB AB SERPL-ACNC: <15 IU/ML
THYROGLOB SERPL-MCNC: 0.21 NG/ML
TSH SERPL-ACNC: 0.66 UIU/ML

## 2025-02-20 PROCEDURE — 36415 COLL VENOUS BLD VENIPUNCTURE: CPT

## 2025-02-20 PROCEDURE — 99214 OFFICE O/P EST MOD 30 MIN: CPT

## 2025-02-20 RX ORDER — LEVOTHYROXINE SODIUM 150 UG/1
150 TABLET ORAL
Refills: 0 | Status: ACTIVE | COMMUNITY

## 2025-02-20 NOTE — ASSESSMENT
[FreeTextEntry1] : Thyroid malignancy labs in office cont synthroid sono next visit f/u 6 months f/u Dr Whiteside

## 2025-02-20 NOTE — PHYSICAL EXAM
[Normal] : external appearance is normal [Midline] : located in midline position [de-identified] : well healed scar

## (undated) DEVICE — SUT CHROMIC 3-0 27" SH

## (undated) DEVICE — BIPOLAR FORCEP KIRWAN CUSHING 6" X 1MM W 12FT CORD (GREEN)

## (undated) DEVICE — LAP PAD W RING 18 X 18"

## (undated) DEVICE — DRAIN RESERVOIR FOR JACKSON PRATT 100CC CARDINAL

## (undated) DEVICE — DRAPE LAPAROTOMY TRANSVERSE

## (undated) DEVICE — SOL IRR POUR H2O 500ML

## (undated) DEVICE — PROTECTOR HEEL / ELBOW FLUFFY

## (undated) DEVICE — SUT SILK 2-0 18" FS

## (undated) DEVICE — SUT VICRYL 3-0 18" TIES UNDYED

## (undated) DEVICE — PREP BETADINE KIT

## (undated) DEVICE — SUT PROLENE 0 30" CT-1

## (undated) DEVICE — SUT MONOCRYL 4-0 27" PS-2 UNDYED

## (undated) DEVICE — DRSG BENZOIN 0.6CC

## (undated) DEVICE — PACK HEAD & NECK

## (undated) DEVICE — CANISTER DISPOSABLE THIN WALL 3000CC

## (undated) DEVICE — LABELS BLANK W PEN

## (undated) DEVICE — APPLICATOR Q TIP 6" WOOD STEM

## (undated) DEVICE — SUT VICRYL 2-0 18" TIES UNDYED

## (undated) DEVICE — SUT VICRYL 2-0 27" SH UNDYED

## (undated) DEVICE — VENODYNE/SCD SLEEVE CALF MEDIUM

## (undated) DEVICE — DRAPE 3/4 SHEET 52X76"

## (undated) DEVICE — GLV 7 PROTEXIS (WHITE)

## (undated) DEVICE — SAFETY PIN

## (undated) DEVICE — SUT VICRYL PLUS 2-0 18" TIES UNDYED

## (undated) DEVICE — SUT VICRYL 3-0 18" SH (POP-OFF)

## (undated) DEVICE — ELCTR BOVIE PENCIL SMOKE EVACUATION

## (undated) DEVICE — ELCTR GROUNDING PAD ADULT COVIDIEN

## (undated) DEVICE — DRAIN JACKSON PRATT 7MM FLAT FULL NO TROCAR

## (undated) DEVICE — SOL IRR POUR H2O 1500ML